# Patient Record
Sex: MALE | Race: WHITE
[De-identification: names, ages, dates, MRNs, and addresses within clinical notes are randomized per-mention and may not be internally consistent; named-entity substitution may affect disease eponyms.]

---

## 2021-06-25 PROBLEM — Z00.00 ENCOUNTER FOR PREVENTIVE HEALTH EXAMINATION: Status: ACTIVE | Noted: 2021-06-25

## 2021-07-23 ENCOUNTER — APPOINTMENT (OUTPATIENT)
Dept: HEART AND VASCULAR | Facility: CLINIC | Age: 76
End: 2021-07-23
Payer: MEDICARE

## 2021-07-23 VITALS
OXYGEN SATURATION: 97 % | WEIGHT: 190 LBS | DIASTOLIC BLOOD PRESSURE: 70 MMHG | SYSTOLIC BLOOD PRESSURE: 130 MMHG | TEMPERATURE: 97.9 F | HEART RATE: 82 BPM

## 2021-07-23 DIAGNOSIS — I10 ESSENTIAL (PRIMARY) HYPERTENSION: ICD-10-CM

## 2021-07-23 DIAGNOSIS — E78.5 HYPERLIPIDEMIA, UNSPECIFIED: ICD-10-CM

## 2021-07-23 DIAGNOSIS — Z82.49 FAMILY HISTORY OF ISCHEMIC HEART DISEASE AND OTHER DISEASES OF THE CIRCULATORY SYSTEM: ICD-10-CM

## 2021-07-23 PROCEDURE — 93280 PM DEVICE PROGR EVAL DUAL: CPT

## 2021-08-01 PROBLEM — I10 BENIGN ESSENTIAL HYPERTENSION: Status: ACTIVE | Noted: 2021-08-01

## 2021-08-01 PROBLEM — Z82.49 FAMILY HISTORY OF CORONARY ARTERY DISEASE: Status: ACTIVE | Noted: 2021-08-01

## 2021-08-01 PROBLEM — E78.5 HYPERLIPIDEMIA, MILD: Status: ACTIVE | Noted: 2021-08-01

## 2021-08-01 RX ORDER — LOSARTAN POTASSIUM 50 MG/1
50 TABLET, FILM COATED ORAL
Refills: 0 | Status: ACTIVE | COMMUNITY

## 2021-08-01 NOTE — DISCUSSION/SUMMARY
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  His device is functioning well and his site is well-healed.  Of note, his is intermittent in CHB, as evidence by his 1:1 AV conduction today and his ventricular pacing rate of 21%.  We will continue to monitor him and perform routine TTE with increased amounts of RV pacing.  Follow-up in 6 months or sooner if needed.

## 2021-08-01 NOTE — PROCEDURE
[DDD] : DDD [Lead Imp:  ___ohms] : lead impedance was [unfilled] ohms [Sensing Amplitude ___mv] : sensing amplitude was [unfilled] mv [___V @] : [unfilled] V [___ ms] : [unfilled] ms [de-identified] : [facility] [de-identified] : Acckristynde [de-identified] : [id number] [de-identified] : 6/22/21 [de-identified] :  [de-identified] : 12.5 years [de-identified] :  21%

## 2021-08-01 NOTE — HISTORY OF PRESENT ILLNESS
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  He noted a HR of 30 on his home pulse oximeter and presented to the ER where he was found to be in complete heart block.  He underwent dual chamber pacemaker ([employer]) implantation and presents today for follow-up.  He has had no repeat episodes of BENNETT and feels well at this time.  He denies any discomfort over his device site.  In addition, no history of chest pain, SOB at rest, dizziness, palpitations, LE edema, orthopnea, PND, and syncope.

## 2021-09-08 ENCOUNTER — NON-APPOINTMENT (OUTPATIENT)
Age: 76
End: 2021-09-08

## 2021-09-29 ENCOUNTER — NON-APPOINTMENT (OUTPATIENT)
Age: 76
End: 2021-09-29

## 2021-09-29 ENCOUNTER — APPOINTMENT (OUTPATIENT)
Dept: HEART AND VASCULAR | Facility: CLINIC | Age: 76
End: 2021-09-29
Payer: MEDICARE

## 2021-09-29 PROCEDURE — 93296 REM INTERROG EVL PM/IDS: CPT

## 2021-09-29 PROCEDURE — 93294 REM INTERROG EVL PM/LDLS PM: CPT

## 2021-10-22 ENCOUNTER — APPOINTMENT (OUTPATIENT)
Dept: HEART AND VASCULAR | Facility: CLINIC | Age: 76
End: 2021-10-22
Payer: MEDICARE

## 2021-10-22 VITALS
DIASTOLIC BLOOD PRESSURE: 70 MMHG | TEMPERATURE: 97.7 F | WEIGHT: 190 LBS | SYSTOLIC BLOOD PRESSURE: 135 MMHG | OXYGEN SATURATION: 98 % | HEART RATE: 82 BPM

## 2021-10-22 PROCEDURE — 93280 PM DEVICE PROGR EVAL DUAL: CPT

## 2021-11-04 NOTE — HISTORY OF PRESENT ILLNESS
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  He noted a HR of 30 on his home pulse oximeter and presented to the ER where he was found to be in complete heart block.  He underwent dual chamber pacemaker ([employer]) implantation and presents today for follow-up.  He has had no repeat episodes of BENNETT and feels well at this time.  He denies any discomfort over his device site.  In addition, no history of chest pain, SOB at rest, dizziness, palpitations, LE edema, orthopnea, PND, and syncope.

## 2021-11-04 NOTE — LETTER BODY
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  Please contact our office at [phone] with any questions. [FreeTextEntry3] : Eileen Hathaway, TOMÁS, FNP-BC

## 2021-11-04 NOTE — DISCUSSION/SUMMARY
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  His device is functioning well and his site is well-healed.  Of note, his is intermittent in CHB, as evidence by his 1:1 AV conduction today and his ventricular pacing rate of 21%.  We will continue to monitor him and perform routine TTE with increased amounts of RV pacing.  Follow-up in 6 months or sooner if needed.

## 2021-11-04 NOTE — PROCEDURE
[DDD] : DDD [Lead Imp:  ___ohms] : lead impedance was [unfilled] ohms [Sensing Amplitude ___mv] : sensing amplitude was [unfilled] mv [___V @] : [unfilled] V [___ ms] : [unfilled] ms [de-identified] : [facility] [de-identified] : Acckristynde [de-identified] : [id number] [de-identified] : 6/22/21 [de-identified] :  [de-identified] : 13.5 years [de-identified] :  24%

## 2022-01-21 ENCOUNTER — APPOINTMENT (OUTPATIENT)
Dept: HEART AND VASCULAR | Facility: CLINIC | Age: 77
End: 2022-01-21
Payer: MEDICARE

## 2022-01-21 ENCOUNTER — NON-APPOINTMENT (OUTPATIENT)
Age: 77
End: 2022-01-21

## 2022-01-21 PROCEDURE — 93294 REM INTERROG EVL PM/LDLS PM: CPT

## 2022-01-21 PROCEDURE — 93296 REM INTERROG EVL PM/IDS: CPT

## 2022-04-15 ENCOUNTER — APPOINTMENT (OUTPATIENT)
Dept: HEART AND VASCULAR | Facility: CLINIC | Age: 77
End: 2022-04-15
Payer: MEDICARE

## 2022-04-15 VITALS
SYSTOLIC BLOOD PRESSURE: 110 MMHG | OXYGEN SATURATION: 98 % | HEART RATE: 80 BPM | WEIGHT: 195 LBS | TEMPERATURE: 98.7 F | DIASTOLIC BLOOD PRESSURE: 70 MMHG

## 2022-04-15 PROCEDURE — 93280 PM DEVICE PROGR EVAL DUAL: CPT

## 2022-04-18 NOTE — HISTORY OF PRESENT ILLNESS
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  He noted a HR of 30 on his home pulse oximeter and presented to the ER where he was found to be in complete heart block.  He underwent dual chamber pacemaker ([employer]) implantation and presents today for follow-up.  He has had no repeat episodes of BENNETT and feels well at this time.  He denies any discomfort over his device site.  In addition, no history of chest pain, SOB at rest, dizziness, palpitations, LE edema, orthopnea, PND, and syncope.

## 2022-04-18 NOTE — LETTER BODY
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  Please contact our office at [phone] with any questions. [FreeTextEntry3] : Eileen Hathaway, TOMÁS, FNP-BC

## 2022-04-18 NOTE — DISCUSSION/SUMMARY
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  His device is functioning well and his site is well-healed.  Of note, his is intermittent in CHB, as evidence by his 1:1 AV conduction today and his ventricular pacing rate of 10%.  We will continue to monitor him and perform routine TTE with increased amounts of RV pacing.  Follow-up in 6 months or sooner if needed.

## 2022-04-18 NOTE — PROCEDURE
[DDD] : DDD [___V @] : [unfilled] V [___ ms] : [unfilled] ms [Lead Imp:  ___ohms] : lead impedance was [unfilled] ohms [Sensing Amplitude ___mv] : sensing amplitude was [unfilled] mv [de-identified] : [facility] [de-identified] : Acckristynde [de-identified] : [id number] [de-identified] : 6/22/21 [de-identified] :  [de-identified] : 14 years [de-identified] :  10%

## 2022-07-18 ENCOUNTER — NON-APPOINTMENT (OUTPATIENT)
Age: 77
End: 2022-07-18

## 2022-07-18 ENCOUNTER — APPOINTMENT (OUTPATIENT)
Dept: HEART AND VASCULAR | Facility: CLINIC | Age: 77
End: 2022-07-18

## 2022-07-18 PROCEDURE — 93296 REM INTERROG EVL PM/IDS: CPT

## 2022-07-18 PROCEDURE — 93294 REM INTERROG EVL PM/LDLS PM: CPT

## 2022-10-21 ENCOUNTER — APPOINTMENT (OUTPATIENT)
Dept: HEART AND VASCULAR | Facility: CLINIC | Age: 77
End: 2022-10-21

## 2022-10-21 VITALS
OXYGEN SATURATION: 98 % | TEMPERATURE: 98.7 F | DIASTOLIC BLOOD PRESSURE: 80 MMHG | SYSTOLIC BLOOD PRESSURE: 140 MMHG | WEIGHT: 195 LBS | HEART RATE: 70 BPM

## 2022-10-21 PROCEDURE — 93280 PM DEVICE PROGR EVAL DUAL: CPT

## 2022-10-25 RX ORDER — ATORVASTATIN CALCIUM 80 MG/1
TABLET, FILM COATED ORAL
Refills: 0 | Status: ACTIVE | COMMUNITY

## 2022-10-25 NOTE — LETTER BODY
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  Please contact our office at [phone] with any questions. [FreeTextEntry3] : Eileen Hathaway, TOMÁS, FNP-BC

## 2022-10-25 NOTE — PROCEDURE
[DDD] : DDD [___ ms] : [unfilled] ms [___V @] : [unfilled] V [Lead Imp:  ___ohms] : lead impedance was [unfilled] ohms [Sensing Amplitude ___mv] : sensing amplitude was [unfilled] mv [de-identified] : [facility] [de-identified] : Acckristynde [de-identified] : [id number] [de-identified] : 6/22/21 [de-identified] :  [de-identified] : 13 years [de-identified] :  6%

## 2022-10-25 NOTE — HISTORY OF PRESENT ILLNESS
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  He noted a HR of 30 on his home pulse oximeter and presented to the ER where he was found to be in complete heart block.  He underwent dual chamber pacemaker ([employer]) implantation and presents today for follow-up.  He has had no repeat episodes of BENNETT and feels well at this time.  He denies any discomfort over his device site.  In addition, no history of chest pain, SOB at rest, dizziness, palpitations, LE edema, orthopnea, PND, and syncope.

## 2022-10-25 NOTE — DISCUSSION/SUMMARY
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  His device is functioning well and his site is well-healed.  Of note, his is intermittent in CHB, as evidence by his 1:1 AV conduction today and his ventricular pacing rate of 6%.  We will continue to monitor him and perform routine TTE with increased amounts of RV pacing.  Follow-up in 6 months or sooner if needed.

## 2023-01-23 ENCOUNTER — NON-APPOINTMENT (OUTPATIENT)
Age: 78
End: 2023-01-23

## 2023-01-23 ENCOUNTER — APPOINTMENT (OUTPATIENT)
Dept: HEART AND VASCULAR | Facility: CLINIC | Age: 78
End: 2023-01-23
Payer: MEDICARE

## 2023-01-23 PROCEDURE — 93294 REM INTERROG EVL PM/LDLS PM: CPT

## 2023-01-23 PROCEDURE — 93296 REM INTERROG EVL PM/IDS: CPT

## 2023-04-28 ENCOUNTER — APPOINTMENT (OUTPATIENT)
Dept: HEART AND VASCULAR | Facility: CLINIC | Age: 78
End: 2023-04-28

## 2023-05-19 ENCOUNTER — APPOINTMENT (OUTPATIENT)
Dept: HEART AND VASCULAR | Facility: CLINIC | Age: 78
End: 2023-05-19
Payer: MEDICARE

## 2023-05-19 ENCOUNTER — APPOINTMENT (OUTPATIENT)
Dept: HEART AND VASCULAR | Facility: CLINIC | Age: 78
End: 2023-05-19

## 2023-05-19 VITALS
WEIGHT: 188 LBS | SYSTOLIC BLOOD PRESSURE: 135 MMHG | DIASTOLIC BLOOD PRESSURE: 70 MMHG | HEART RATE: 76 BPM | OXYGEN SATURATION: 96 % | TEMPERATURE: 98.7 F

## 2023-05-19 PROCEDURE — 99213 OFFICE O/P EST LOW 20 MIN: CPT

## 2023-05-19 PROCEDURE — 93280 PM DEVICE PROGR EVAL DUAL: CPT

## 2023-05-19 NOTE — PROCEDURE
[NSR] : normal sinus rhythm [DDD] : DDD [Lead Imp:  ___ohms] : lead impedance was [unfilled] ohms [Sensing Amplitude ___mv] : sensing amplitude was [unfilled] mv [___V @] : [unfilled] V [___ ms] : [unfilled] ms [de-identified] : [facility] [de-identified] : Acckristynde [de-identified] : [id number] [de-identified] : 6/22/21 [de-identified] :  [de-identified] : 12 years [de-identified] :  4%

## 2023-05-19 NOTE — HISTORY OF PRESENT ILLNESS
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  He noted a HR of 30 on his home pulse oximeter and presented to the ER where he was found to be in complete heart block.  He underwent dual chamber pacemaker ([employer]) implantation.  Presents today for follow up.  He is feeling well without any repeat episodes of BENNETT.  No device related complaints.  Denies any palpitations, dizziness, chest pain, SOB or syncope.

## 2023-05-19 NOTE — REVIEW OF SYSTEMS
[Negative] : Heme/Lymph [Fever] : no fever [Chills] : no chills [Feeling Fatigued] : not feeling fatigued [Blurry Vision] : no blurred vision

## 2023-05-19 NOTE — LETTER BODY
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  Please contact our office at [phone] with any questions. [FreeTextEntry3] : Eileen Hathaway, TOMÁS, FNP-BC

## 2023-05-19 NOTE — ADDENDUM
[FreeTextEntry1] : I, Stephani Haney, am scribing for and the presence of Dr. Pickett the following sections: HPI, PMH,Family/social history, ROS, Physical Exam, Assessment / Plan. \par \par INela, personally performed the services described in the documentation, reviewed the documentation recorded by the scribe in my presence and it accurately and completely records my words and actions.\par

## 2023-05-19 NOTE — DISCUSSION/SUMMARY
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 No complaints.  His device is functioning. atrial tachy events noted due to far field sensing   Of note, his is intermittent in CHB, as evidence by his 1:1 AV conduction today and his ventricular pacing rate of 4%.  We will continue to monitor him and perform routine TTE with increased amounts of RV pacing.  Follow-up in 6 months or sooner if needed.

## 2023-06-23 ENCOUNTER — APPOINTMENT (OUTPATIENT)
Dept: HEART AND VASCULAR | Facility: CLINIC | Age: 78
End: 2023-06-23

## 2023-08-21 ENCOUNTER — APPOINTMENT (OUTPATIENT)
Dept: HEART AND VASCULAR | Facility: CLINIC | Age: 78
End: 2023-08-21

## 2023-09-25 ENCOUNTER — NON-APPOINTMENT (OUTPATIENT)
Age: 78
End: 2023-09-25

## 2023-09-25 ENCOUNTER — APPOINTMENT (OUTPATIENT)
Dept: HEART AND VASCULAR | Facility: CLINIC | Age: 78
End: 2023-09-25
Payer: MEDICARE

## 2023-09-25 PROCEDURE — 93296 REM INTERROG EVL PM/IDS: CPT

## 2023-09-25 PROCEDURE — 93294 REM INTERROG EVL PM/LDLS PM: CPT

## 2024-01-26 ENCOUNTER — APPOINTMENT (OUTPATIENT)
Dept: HEART AND VASCULAR | Facility: CLINIC | Age: 79
End: 2024-01-26
Payer: MEDICARE

## 2024-01-26 DIAGNOSIS — I44.2 ATRIOVENTRICULAR BLOCK, COMPLETE: ICD-10-CM

## 2024-01-26 PROCEDURE — 99213 OFFICE O/P EST LOW 20 MIN: CPT | Mod: 25

## 2024-01-26 PROCEDURE — 93280 PM DEVICE PROGR EVAL DUAL: CPT

## 2024-02-02 PROBLEM — I44.2 COMPLETE HEART BLOCK: Status: ACTIVE | Noted: 2021-08-01

## 2024-02-02 NOTE — PROCEDURE
[NSR] : normal sinus rhythm [DDD] : DDD [Lead Imp:  ___ohms] : lead impedance was [unfilled] ohms [Sensing Amplitude ___mv] : sensing amplitude was [unfilled] mv [___V @] : [unfilled] V [___ ms] : [unfilled] ms [de-identified] : [facility] [de-identified] : Acckristynde [de-identified] : [id number] [de-identified] : 6/22/21 [de-identified] :  [de-identified] : 13 years [de-identified] :  3% Several tachy events which appear to be farfield oversensing.
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
## 2024-02-22 ENCOUNTER — NON-APPOINTMENT (OUTPATIENT)
Age: 79
End: 2024-02-22

## 2024-02-23 ENCOUNTER — APPOINTMENT (OUTPATIENT)
Dept: HEART AND VASCULAR | Facility: CLINIC | Age: 79
End: 2024-02-23
Payer: MEDICARE

## 2024-02-23 PROCEDURE — 93296 REM INTERROG EVL PM/IDS: CPT

## 2024-02-23 PROCEDURE — 93294 REM INTERROG EVL PM/LDLS PM: CPT

## 2024-02-26 ENCOUNTER — APPOINTMENT (OUTPATIENT)
Dept: PULMONOLOGY | Facility: CLINIC | Age: 79
End: 2024-02-26
Payer: MEDICARE

## 2024-02-26 VITALS
WEIGHT: 198 LBS | HEART RATE: 72 BPM | DIASTOLIC BLOOD PRESSURE: 90 MMHG | BODY MASS INDEX: 33.8 KG/M2 | HEIGHT: 64 IN | SYSTOLIC BLOOD PRESSURE: 150 MMHG

## 2024-02-26 DIAGNOSIS — R06.83 SNORING: ICD-10-CM

## 2024-02-26 DIAGNOSIS — Z78.9 OTHER SPECIFIED HEALTH STATUS: ICD-10-CM

## 2024-02-26 DIAGNOSIS — G47.19 OTHER HYPERSOMNIA: ICD-10-CM

## 2024-02-26 PROCEDURE — 99213 OFFICE O/P EST LOW 20 MIN: CPT
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
## 2024-02-26 NOTE — PHYSICAL EXAM
[III] : III [Enlarged Base of the Tongue] : enlargement of the base of the tongue [General Appearance - Well Developed] : well developed [General Appearance - Well Nourished] : well nourished [Heart Sounds] : normal S1 and S2 [Murmurs] : no murmurs [] : no respiratory distress [Auscultation Breath Sounds / Voice Sounds] : lungs were clear to auscultation bilaterally [No Focal Deficits] : no focal deficits [Oriented To Time, Place, And Person] : oriented to person, place, and time [Memory Recent] : recent memory was not impaired [FreeTextEntry1] : no edema

## 2024-02-26 NOTE — ASSESSMENT
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  Will also find out if there is significant sleep apnea as he has a very high STOP-BANG score of 6 and discussed about CPAP therapy with the patient today.

## 2024-04-08 ENCOUNTER — RESULT REVIEW (OUTPATIENT)
Age: 79
End: 2024-04-08

## 2024-04-09 ENCOUNTER — TRANSCRIPTION ENCOUNTER (OUTPATIENT)
Age: 79
End: 2024-04-09

## 2024-04-26 DIAGNOSIS — G47.33 OBSTRUCTIVE SLEEP APNEA (ADULT) (PEDIATRIC): ICD-10-CM

## 2024-04-26 DIAGNOSIS — G47.31 PRIMARY CENTRAL SLEEP APNEA: ICD-10-CM

## 2024-04-27 ENCOUNTER — RESULT REVIEW (OUTPATIENT)
Age: 79
End: 2024-04-27

## 2024-04-30 ENCOUNTER — RESULT REVIEW (OUTPATIENT)
Age: 79
End: 2024-04-30

## 2024-05-01 ENCOUNTER — TRANSCRIPTION ENCOUNTER (OUTPATIENT)
Age: 79
End: 2024-05-01

## 2024-05-28 ENCOUNTER — APPOINTMENT (OUTPATIENT)
Dept: HEART AND VASCULAR | Facility: CLINIC | Age: 79
End: 2024-05-28

## 2024-07-02 ENCOUNTER — APPOINTMENT (OUTPATIENT)
Dept: HEART AND VASCULAR | Facility: CLINIC | Age: 79
End: 2024-07-02

## 2024-08-05 ENCOUNTER — NON-APPOINTMENT (OUTPATIENT)
Age: 79
End: 2024-08-05

## 2024-08-06 ENCOUNTER — APPOINTMENT (OUTPATIENT)
Dept: HEART AND VASCULAR | Facility: CLINIC | Age: 79
End: 2024-08-06

## 2024-08-06 PROCEDURE — 93294 REM INTERROG EVL PM/LDLS PM: CPT

## 2024-08-06 PROCEDURE — 93296 REM INTERROG EVL PM/IDS: CPT

## 2024-11-11 ENCOUNTER — APPOINTMENT (OUTPATIENT)
Dept: HEART AND VASCULAR | Facility: CLINIC | Age: 79
End: 2024-11-11

## 2024-12-17 ENCOUNTER — APPOINTMENT (OUTPATIENT)
Dept: HEART AND VASCULAR | Facility: CLINIC | Age: 79
End: 2024-12-17

## 2024-12-17 PROCEDURE — 93296 REM INTERROG EVL PM/IDS: CPT

## 2024-12-17 PROCEDURE — 93294 REM INTERROG EVL PM/LDLS PM: CPT

## 2024-12-25 PROBLEM — F10.90 ALCOHOL USE: Status: ACTIVE | Noted: 2021-08-01

## 2025-03-18 ENCOUNTER — APPOINTMENT (OUTPATIENT)
Dept: HEART AND VASCULAR | Facility: CLINIC | Age: 80
End: 2025-03-18

## 2025-03-18 ENCOUNTER — NON-APPOINTMENT (OUTPATIENT)
Age: 80
End: 2025-03-18

## 2025-03-18 PROCEDURE — 93294 REM INTERROG EVL PM/LDLS PM: CPT

## 2025-03-18 PROCEDURE — 93296 REM INTERROG EVL PM/IDS: CPT

## 2025-06-04 ENCOUNTER — APPOINTMENT (OUTPATIENT)
Dept: HEART AND VASCULAR | Facility: CLINIC | Age: 80
End: 2025-06-04
Payer: MEDICARE

## 2025-06-04 ENCOUNTER — NON-APPOINTMENT (OUTPATIENT)
Age: 80
End: 2025-06-04

## 2025-06-04 ENCOUNTER — APPOINTMENT (OUTPATIENT)
Dept: HEART AND VASCULAR | Facility: CLINIC | Age: 80
End: 2025-06-04

## 2025-06-04 VITALS
OXYGEN SATURATION: 98 % | HEIGHT: 64 IN | HEART RATE: 73 BPM | RESPIRATION RATE: 16 BRPM | SYSTOLIC BLOOD PRESSURE: 126 MMHG | TEMPERATURE: 97.2 F | DIASTOLIC BLOOD PRESSURE: 76 MMHG | BODY MASS INDEX: 33.8 KG/M2 | WEIGHT: 198 LBS

## 2025-06-04 DIAGNOSIS — Z95.0 PRESENCE OF CARDIAC PACEMAKER: ICD-10-CM

## 2025-06-04 DIAGNOSIS — I44.2 ATRIOVENTRICULAR BLOCK, COMPLETE: ICD-10-CM

## 2025-06-04 PROCEDURE — 99214 OFFICE O/P EST MOD 30 MIN: CPT | Mod: 25

## 2025-06-04 PROCEDURE — 93280 PM DEVICE PROGR EVAL DUAL: CPT

## 2025-06-04 RX ORDER — LOSARTAN POTASSIUM 50 MG/1
50 TABLET, FILM COATED ORAL
Refills: 0 | Status: ACTIVE | COMMUNITY

## 2025-06-04 RX ORDER — CLOPIDOGREL BISULFATE 75 MG/1
75 TABLET, FILM COATED ORAL DAILY
Refills: 0 | Status: ACTIVE | COMMUNITY

## 2025-06-04 RX ORDER — METOPROLOL SUCCINATE 25 MG/1
25 TABLET, EXTENDED RELEASE ORAL
Refills: 0 | Status: ACTIVE | COMMUNITY

## 2025-06-04 RX ORDER — ASPIRIN 81 MG/1
81 TABLET, DELAYED RELEASE ORAL
Refills: 0 | Status: ACTIVE | COMMUNITY

## 2025-06-04 RX ORDER — COLCHICINE 0.6 MG/1
0.6 TABLET ORAL
Refills: 0 | Status: ACTIVE | COMMUNITY

## 2025-06-04 RX ORDER — ALLOPURINOL 300 MG/1
300 TABLET ORAL
Refills: 0 | Status: ACTIVE | COMMUNITY

## 2025-06-04 RX ORDER — ATORVASTATIN CALCIUM 80 MG/1
80 TABLET, FILM COATED ORAL DAILY
Refills: 0 | Status: ACTIVE | COMMUNITY

## 2025-06-20 ENCOUNTER — APPOINTMENT (OUTPATIENT)
Dept: HEART AND VASCULAR | Facility: CLINIC | Age: 80
End: 2025-06-20

## 2025-07-08 ENCOUNTER — TRANSCRIPTION ENCOUNTER (OUTPATIENT)
Age: 80
End: 2025-07-08

## 2025-07-14 ENCOUNTER — TRANSCRIPTION ENCOUNTER (OUTPATIENT)
Age: 80
End: 2025-07-14

## 2025-09-03 ENCOUNTER — APPOINTMENT (OUTPATIENT)
Dept: HEART AND VASCULAR | Facility: CLINIC | Age: 80
End: 2025-09-03